# Patient Record
Sex: MALE | Race: WHITE | NOT HISPANIC OR LATINO | Employment: OTHER | URBAN - METROPOLITAN AREA
[De-identification: names, ages, dates, MRNs, and addresses within clinical notes are randomized per-mention and may not be internally consistent; named-entity substitution may affect disease eponyms.]

---

## 2024-04-29 ENCOUNTER — HOSPITAL ENCOUNTER (OUTPATIENT)
Dept: OTHER | Facility: HOSPITAL | Age: 69
Discharge: HOME | End: 2024-04-29
Payer: MEDICARE

## 2024-04-30 ENCOUNTER — OFFICE VISIT (OUTPATIENT)
Dept: NEUROSURGERY | Facility: CLINIC | Age: 69
End: 2024-04-30
Payer: MEDICARE

## 2024-04-30 VITALS
HEART RATE: 72 BPM | HEIGHT: 72 IN | DIASTOLIC BLOOD PRESSURE: 68 MMHG | WEIGHT: 187 LBS | SYSTOLIC BLOOD PRESSURE: 120 MMHG | BODY MASS INDEX: 25.33 KG/M2

## 2024-04-30 DIAGNOSIS — G56.03 BILATERAL CARPAL TUNNEL SYNDROME: ICD-10-CM

## 2024-04-30 DIAGNOSIS — M50.30 DDD (DEGENERATIVE DISC DISEASE), CERVICAL: Primary | ICD-10-CM

## 2024-04-30 DIAGNOSIS — G56.23 ULNAR NEUROPATHY OF BOTH UPPER EXTREMITIES: ICD-10-CM

## 2024-04-30 DIAGNOSIS — R29.898 WEAKNESS OF BOTH ARMS: ICD-10-CM

## 2024-04-30 DIAGNOSIS — M54.2 CERVICALGIA: ICD-10-CM

## 2024-04-30 DIAGNOSIS — M54.12 C6 RADICULOPATHY: ICD-10-CM

## 2024-04-30 DIAGNOSIS — M47.812 FACET ARTHRITIS OF CERVICAL REGION: ICD-10-CM

## 2024-04-30 PROCEDURE — 99204 OFFICE O/P NEW MOD 45 MIN: CPT | Performed by: PHYSICIAN ASSISTANT

## 2024-04-30 RX ORDER — DULOXETIN HYDROCHLORIDE 60 MG/1
60 CAPSULE, DELAYED RELEASE ORAL DAILY
COMMUNITY

## 2024-04-30 RX ORDER — HYDRALAZINE HYDROCHLORIDE 10 MG/1
10 TABLET, FILM COATED ORAL 4 TIMES DAILY
COMMUNITY
Start: 2024-01-29

## 2024-04-30 RX ORDER — ATORVASTATIN CALCIUM 40 MG/1
1 TABLET, FILM COATED ORAL DAILY
COMMUNITY
Start: 2024-03-26 | End: 2024-06-24

## 2024-04-30 RX ORDER — ACETAMINOPHEN 500 MG
500 TABLET ORAL EVERY 6 HOURS PRN
COMMUNITY

## 2024-04-30 RX ORDER — AMLODIPINE BESYLATE 5 MG/1
5 TABLET ORAL
COMMUNITY
Start: 2024-01-29

## 2024-04-30 RX ORDER — PANTOPRAZOLE SODIUM 40 MG/1
1 TABLET, DELAYED RELEASE ORAL DAILY
COMMUNITY
Start: 2024-03-26 | End: 2024-06-24

## 2024-04-30 RX ORDER — DULOXETIN HYDROCHLORIDE 30 MG/1
30 CAPSULE, DELAYED RELEASE ORAL DAILY
COMMUNITY

## 2024-05-13 ENCOUNTER — TELEPHONE (OUTPATIENT)
Dept: NEUROSURGERY | Facility: CLINIC | Age: 69
End: 2024-05-13
Payer: MEDICARE

## 2024-05-13 NOTE — TELEPHONE ENCOUNTER
Caller: KWABENA FINLEY    Relationship to patient: Emergency Contact    Best call back number: [phone]    Patient is needing: PATIENTS WIFE CALLED, STATES PATIENT COMPLETED MRI CERVICAL 05/10/24 @ [facility]. PATIENTS WIFE WOULD LIKE TO SEE IF PATIENT CAN BE SEEN SOONER THAN 05/28/24. PER LAST OV NOTE: F/U IN 4 WEEKS (AROUND 05/28/24). PLEASE REVIEW, PLEASE CALL PATIENT OR PATIENTS WIFE TO ADVISE.    THANK YOU

## 2024-05-14 DIAGNOSIS — M50.30 DDD (DEGENERATIVE DISC DISEASE), CERVICAL: ICD-10-CM

## 2024-05-14 DIAGNOSIS — R29.898 WEAKNESS OF BOTH ARMS: ICD-10-CM

## 2024-05-14 DIAGNOSIS — M47.812 FACET ARTHRITIS OF CERVICAL REGION: ICD-10-CM

## 2024-05-14 DIAGNOSIS — M54.2 CERVICALGIA: ICD-10-CM

## 2024-05-14 DIAGNOSIS — M54.12 C6 RADICULOPATHY: ICD-10-CM

## 2024-05-17 ENCOUNTER — HOSPITAL ENCOUNTER (OUTPATIENT)
Dept: OTHER | Facility: HOSPITAL | Age: 69
Discharge: HOME | End: 2024-05-17

## 2024-05-17 ENCOUNTER — OFFICE VISIT (OUTPATIENT)
Dept: NEUROSURGERY | Facility: CLINIC | Age: 69
End: 2024-05-17
Payer: MEDICARE

## 2024-05-17 VITALS
DIASTOLIC BLOOD PRESSURE: 66 MMHG | BODY MASS INDEX: 25.19 KG/M2 | WEIGHT: 186 LBS | HEART RATE: 91 BPM | SYSTOLIC BLOOD PRESSURE: 121 MMHG | HEIGHT: 72 IN

## 2024-05-17 DIAGNOSIS — M48.02 FORAMINAL STENOSIS OF CERVICAL REGION: ICD-10-CM

## 2024-05-17 DIAGNOSIS — M54.12 C6 RADICULOPATHY: ICD-10-CM

## 2024-05-17 DIAGNOSIS — M54.2 CERVICALGIA: ICD-10-CM

## 2024-05-17 DIAGNOSIS — G56.23 ULNAR NEUROPATHY OF BOTH UPPER EXTREMITIES: ICD-10-CM

## 2024-05-17 DIAGNOSIS — M50.21 HERNIATED NUCLEUS PULPOSUS, C3-4: Primary | ICD-10-CM

## 2024-05-17 DIAGNOSIS — G56.03 BILATERAL CARPAL TUNNEL SYNDROME: ICD-10-CM

## 2024-05-17 DIAGNOSIS — R29.898 WEAKNESS OF BOTH ARMS: ICD-10-CM

## 2024-05-17 DIAGNOSIS — M48.02 SPINAL STENOSIS IN CERVICAL REGION: ICD-10-CM

## 2024-05-17 RX ORDER — MELOXICAM 7.5 MG/1
7.5 TABLET ORAL DAILY
COMMUNITY

## 2024-05-17 NOTE — PROGRESS NOTES
Patient being seen for today for Follow-up  .    Subjective    Warren Baker is a 68 y.o. male that presents with Follow-up  .    HPI  Previously: Last seen on 4/30/2024 for complaints of neck and bilateral arm pain to all the fingers.  He had CT of the cervical spine showing multilevel degenerative change worse at C5-C6, C6-C7 and C7-T1 with likely mild central canal narrowing at C5-C6.  There is NCV/EMG testing from 3/19/2024 showing bilateral median and ulnar neuropathy as well as evidence suggestive of C5-C6 radiculopathy.  There was an order for MRI of the cervical spine without contrast to evaluate the cervical spine pathology further.    Today: He continues with neck and bilateral arm pain to all the fingers, worse in the 1st-3rd digits.    He denies new or changed complaints.     reports that he has been smoking cigarettes. He has never used smokeless tobacco.    Review of Systems   Musculoskeletal:  Positive for neck pain.   Neurological:  Positive for weakness (left arm) and numbness.       Objective   Vitals:    05/17/24 1036   BP: 121/66   Pulse: 91        Physical Exam  Constitutional:       Appearance: Normal appearance.   Pulmonary:      Effort: Pulmonary effort is normal.   Musculoskeletal:         General: Tenderness (right cervical muscles) present.      Comments: Alvarado's and clonus negative bilaterally   Neurological:      General: No focal deficit present.      Mental Status: He is alert and oriented to person, place, and time.      Sensory: No sensory deficit.      Motor: Weakness (left hand and left elbow flexion and extension) present.      Deep Tendon Reflexes: Reflexes normal.   Psychiatric:         Mood and Affect: Mood normal.         Behavior: Behavior normal.          Result Review   I have personally interpreted the MRI of the cervical spine without contrast from 5/10/2024 which shows disc bulging with severe stenosis at C3-C4, there is likely T2 signal change in the left anterior  spinal cord at this level.  Foraminal stenosis is worse on the right at this level.  There is also severe right and moderate to severe left foraminal narrowing at C4-C5, severe bilateral foraminal narrowing at C5-C6 worse on the left.  There is moderate to severe bilateral foraminal narrowing at C6-C7 worse on the left.     Assessment and Plan {CC Problem List  Visit Diagnosis  ROS  Review (Popup)  [facility]  Quality  BestPractice  Medications  SmartSets  SnapShot Encounters  Media :23}   Diagnoses and all orders for this visit:    1. Herniated nucleus pulposus, C3-4 (Primary)    2. Spinal stenosis in cervical region    3. Foraminal stenosis of cervical region    4. Cervicalgia    5. Weakness of both arms    6. C6 radiculopathy    7. Bilateral carpal tunnel syndrome    8. Ulnar neuropathy of both upper extremities    There appears to be T2 signal change in the spinal cord at the C3-C4 level with severe stenosis on the MRI.    There is also multilevel central canal and foraminal stenosis.    He also has median and ulnar neuropathy which may contribute to the arm complaints.    He will follow-up with Dr. Frazier to discuss the MRI and NCV/EMG findings and have further recommendation for possible surgical approach.    Follow Up {Instructions Charge Capture  Follow-up Communications :23}   Return for Next available Thursday to discuss surgery with Dr. Frazier.

## 2024-06-06 ENCOUNTER — OFFICE VISIT (OUTPATIENT)
Dept: NEUROSURGERY | Facility: CLINIC | Age: 69
End: 2024-06-06
Payer: MEDICARE

## 2024-06-06 VITALS
HEIGHT: 72 IN | BODY MASS INDEX: 24.89 KG/M2 | WEIGHT: 183.8 LBS | DIASTOLIC BLOOD PRESSURE: 72 MMHG | SYSTOLIC BLOOD PRESSURE: 110 MMHG

## 2024-06-06 DIAGNOSIS — G95.89 MYELOMALACIA OF CERVICAL CORD: ICD-10-CM

## 2024-06-06 DIAGNOSIS — M48.02 SPINAL STENOSIS IN CERVICAL REGION: Primary | ICD-10-CM

## 2024-06-06 RX ORDER — ASPIRIN 81 MG/1
81 TABLET ORAL DAILY
COMMUNITY

## 2024-06-06 NOTE — PROGRESS NOTES
Warren Baker is a 68 y.o. male that presents with Neck Pain       He has multilevel cervical changes. He has bilateral arm numbness. He noticed worsening symptoms after a fall on New Year's Ara. Nothing makes his symptoms worse or better.     Neck Pain   Associated symptoms include numbness.       Review of Systems   Musculoskeletal:  Positive for gait problem, myalgias and neck pain.   Neurological:  Positive for numbness.        Vitals:    06/06/24 0950   BP: 110/72        Physical Exam  Cardiovascular:      Comments: No notable edema    Pulmonary:      Effort: Pulmonary effort is normal.   Neurological:      Mental Status: He is alert.      Motor: No weakness.      Deep Tendon Reflexes: Reflexes abnormal (+ Hoffmans, right greater than left, 3/4 BUE, no clonus).      Comments: Walks with cane   Psychiatric:         Mood and Affect: Mood normal.             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 Surgery could help reduce the risk of further injury, but not likely fix all his current symptoms.    Follow Up {Instructions Charge Capture  Follow-up Communications :23}   No follow-ups on file.

## 2024-06-17 ENCOUNTER — TELEPHONE (OUTPATIENT)
Dept: NEUROSURGERY | Facility: CLINIC | Age: 69
End: 2024-06-17
Payer: MEDICARE

## 2024-06-18 NOTE — TELEPHONE ENCOUNTER
Looks like patient needs cardiac clearance prior to scheduling. Will contact patient to find out who cardiologist is.

## 2024-06-19 NOTE — TELEPHONE ENCOUNTER
Letter created for Dr. Garcia with  Cardiology. Left voicemail to notify Leda that letter has been sent and once received back, patient will be contacted to schedule. Ok for hub to relay.

## 2024-07-03 PROBLEM — G95.89 MYELOMALACIA OF CERVICAL CORD: Status: ACTIVE | Noted: 2024-06-06

## 2024-07-03 PROBLEM — M48.02 SPINAL STENOSIS IN CERVICAL REGION: Status: ACTIVE | Noted: 2024-06-06

## 2024-07-25 ENCOUNTER — TELEPHONE (OUTPATIENT)
Dept: NEUROSURGERY | Facility: CLINIC | Age: 69
End: 2024-07-25
Payer: MEDICARE

## 2024-07-25 NOTE — TELEPHONE ENCOUNTER
Received a message from financial clearance that patient's surgery scheduled for 8-7-24 will need to be rescheduled due to nonpar insurance. Could someone confirm what insurance he had at last office visit when surgery was scheduled and that it is nonpar with [facility]?

## 2024-07-25 NOTE — TELEPHONE ENCOUNTER
Pt insurance was entered as [employer] non par. I am showing that patient has a human[employer] medicare replacement plan and insurance plan has been changed to reflect that in epic. We are in network with all humana medicare replacement plans.     Please let me know if there is anything else you need me to do.

## 2024-07-25 NOTE — TELEPHONE ENCOUNTER
Sent message back to financial clearance regarding. Will wait to hear back from them before proceeding.

## 2024-07-26 NOTE — TELEPHONE ENCOUNTER
Received message back from financial clearance that patient's insurance is in-network, and they have received an approval.

## 2024-08-02 RX ORDER — PANTOPRAZOLE SODIUM 40 MG/1
40 TABLET, DELAYED RELEASE ORAL DAILY
COMMUNITY

## 2024-08-02 RX ORDER — ATORVASTATIN CALCIUM 40 MG/1
40 TABLET, FILM COATED ORAL DAILY
COMMUNITY

## 2024-08-02 NOTE — PRE-PROCEDURE INSTRUCTIONS
PATIENT INSTRUCTED TO BE:    - NOTHING TO EAT AFTER MIDNIGHT OR CHEW, EXCEPT CAN HAVE CLEAR LIQUIDS 2 HOURS PRIOR TO SURGERY ARRIVAL TIME , NO MORE THAN 8 OZ. (NOTHING RED)     - TO HOLD ALL VITAMINS, SUPPLEMENTS, NSAIDS FOR ONE WEEK PRIOR TO THEIR SURGICAL PROCEDURE ( STOP MELOXICAM)    - DO NOT TAKE __N/A____________________ 7 DAYS PRIOR TO PROCEDURE PER ANESTHESIA RECOMMENDATIONS/INSTRUCTIONS     - INSTRUCTED PT TO USE SURGICAL SOAP 1 TIME THE NIGHT PRIOR TO SURGERY __1-7-50_________ OR THE AM OF SURGERY ___1-4-40__________   USE THE SOAP FROM NECK TO TOES, AVOID THEIR FACE, HAIR, AND PRIVATE PARTS. IF USE THE SOAP THE NIGHT PRIOR TO SURGERY, CHANGE BED LINENS AND NO PETS IN THE BED.     INSTRUCTED NO LOTIONS, JEWELRY, PIERCINGS,  NAIL POLISH, OR DEODORANT DAY OF SURGERY    - IF DIABETIC, CHECK BLOOD GLUCOSE IF LESS THAN 70 OR HAVING SYMPTOMS CALL THE PREOP AREA FOR INSTRUCTIONS ON AM OF SURGERY ([phone] )    -INSTRUCTED TO TAKE THE FOLLOWING MEDICATIONS THE DAY OF SURGERY WITH SIPS OF WATER:        TYLENOL IF NEEDED, NORVASC, LIPITOR, CYMBALTA, APRESOLINE, PROTONIX        - DO NOT BRING ANY MEDICATIONS WITH YOU TO THE HOSPITAL THE DAY OF SURGERY, EXCEPT IF USE INHALERS. BRING INHALERS DAY OF SURGERY       - BRING CPAP OR BIPAP TO THE HOSPITAL ONLY IF YOU ARE SPENDING THE NIGHT    - DO NOT SMOKE OR VAPE 24 HOURS PRIOR TO PROCEDURE PER ANESTHESIA REQUEST     -MAKE SURE YOU HAVE A RIDE HOME OR SOMEONE TO STAY WITH YOU THE DAY OF THE PROCEDURE AFTER YOU GO HOME     - FOLLOW ANY OTHER INSTRUCTIONS GIVEN TO YOU BY YOUR SURGEON'S OFFICE.     - DAY OF SURGERY _8-7-24_, COME TO ELEVATOR A, THIRD FLOOR, CHECK IN AT THE DESK FOR REGISTRATION/SURGERY     - YOU WILL RECEIVE A PHONE CALL THE DAY PRIOR TO SURGERY BETWEEN 1PM AND 4 PM WITH ARRIVAL TIME, IF YOUR SURGERY IS ON A MONDAY YOU WILL RECEIVE A CALL THE FRIDAY PRIOR TO SURGERY DATE    - BRING CASH OR CREDIT CARD FOR COPAYMENT OF MEDICATIONS AFTER SURGERY IF YOU USE  THE HOSPITAL PHARMACY (MEDS TO BED)    - PREADMISSION TESTING NURSE FABIÁN BUCK -[phone] IF HAVE ANY QUESTIONS     -PATIENT PROVIDED THE NUMBER FOR PREOP SURGICAL DEPT IF HAD QUESTIONS AFTER HOURS PRIOR TO SURGERY ([phone] ).  INFORMED PT IF NO ANSWER, LEAVE A MESSAGE AND SOMEONE WILL RETURN THEIR CALL       PATIENT VERBALIZED UNDERSTANDING       Clear Liquid Diet        Find out when you need to start a clear liquid diet.   Think of “clear liquids” as anything you could read a newspaper through. This includes things like water, broth, sports drinks, or tea WITHOUT any kind of milk or cream.           Once you are told to start a clear liquid diet, only drink these things until 2 hours before arrival to the hospital or when the hospital says to stop. Total volume limitation: 8 oz.       Clear liquids you CAN drink:   Water   Clear broth: beef, chicken, vegetable, or bone broth with nothing in it   Gatorade   Lemonade or Sanket-aid   Soda   Tea, coffee (NO cream or honey)   Jell-O (without fruit)   Popsicles (without fruit or cream)   Italian ices   Juice without pulp: apple, white, grape   You may use salt, pepper, and sugar  NO RED  NO NOODLES    Do NOT drink:   Milk or cream   Soy milk, almond milk, coconut milk, or other non-dairy drinks and   creamers   Milkshakes or smoothies   Tomato juice   Orange juice   Grapefruit juice   Cream soups or any other than broth         Clear Liquid Diet:  Do NOT eat any solid food.  Do NOT eat or suck on mints or candy.  Do NOT chew gum.  Do NOT drink thick liquids like milk or juice with pulp in it.  Do NOT add milk, cream, or anything like soy milk or almond milk to coffee or tea.

## 2024-08-05 ENCOUNTER — ANESTHESIA EVENT (OUTPATIENT)
Dept: PERIOP | Facility: HOSPITAL | Age: 69
End: 2024-08-05
Payer: MEDICARE

## 2024-08-07 ENCOUNTER — ANESTHESIA (OUTPATIENT)
Dept: PERIOP | Facility: HOSPITAL | Age: 69
End: 2024-08-07
Payer: MEDICARE

## 2024-08-07 ENCOUNTER — APPOINTMENT (OUTPATIENT)
Dept: GENERAL RADIOLOGY | Facility: HOSPITAL | Age: 69
End: 2024-08-07
Payer: MEDICARE

## 2024-08-07 ENCOUNTER — HOSPITAL ENCOUNTER (OUTPATIENT)
Facility: HOSPITAL | Age: 69
Discharge: HOME | End: 2024-08-08
Attending: NEUROLOGICAL SURGERY | Admitting: NEUROLOGICAL SURGERY
Payer: MEDICARE

## 2024-08-07 DIAGNOSIS — M48.02 SPINAL STENOSIS IN CERVICAL REGION: ICD-10-CM

## 2024-08-07 DIAGNOSIS — G95.89 MYELOMALACIA OF CERVICAL CORD: ICD-10-CM

## 2024-08-07 DIAGNOSIS — Z98.1 STATUS POST CERVICAL SPINAL FUSION: Primary | ICD-10-CM

## 2024-08-07 LAB
QT INTERVAL: 382 MS
QTC INTERVAL: 455 MS

## 2024-08-07 PROCEDURE — C1713 ANCHOR/SCREW BN/BN,TIS/BN: HCPCS | Performed by: NEUROLOGICAL SURGERY

## 2024-08-07 PROCEDURE — 22551 ARTHRD ANT NTRBDY CERVICAL: CPT | Performed by: NEUROLOGICAL SURGERY

## 2024-08-07 PROCEDURE — S0260 H&P FOR SURGERY: HCPCS | Performed by: NEUROLOGICAL SURGERY

## 2024-08-07 PROCEDURE — 25010000002 DEXAMETHASONE PER 1 MG

## 2024-08-07 PROCEDURE — 25010000002 CEFAZOLIN PER 500 MG: Performed by: NEUROLOGICAL SURGERY

## 2024-08-07 PROCEDURE — 20931 SP BONE ALGRFT STRUCT ADD-ON: CPT | Performed by: NEUROLOGICAL SURGERY

## 2024-08-07 PROCEDURE — 76000 FLUOROSCOPY <1 HR PHYS/QHP: CPT

## 2024-08-07 PROCEDURE — 25810000003 LACTATED RINGERS PER 1000 ML: Performed by: ANESTHESIOLOGY

## 2024-08-07 PROCEDURE — 22551 ARTHRD ANT NTRBDY CERVICAL: CPT | Performed by: SPECIALIST/TECHNOLOGIST, OTHER

## 2024-08-07 PROCEDURE — 93005 ELECTROCARDIOGRAM TRACING: CPT | Performed by: ANESTHESIOLOGY

## 2024-08-07 PROCEDURE — 22845 INSERT SPINE FIXATION DEVICE: CPT | Performed by: NEUROLOGICAL SURGERY

## 2024-08-07 PROCEDURE — 25010000002 MIDAZOLAM PER 1MG: Performed by: ANESTHESIOLOGY

## 2024-08-07 PROCEDURE — 25010000002 PROPOFOL 200 MG/20ML EMULSION

## 2024-08-07 PROCEDURE — 94799 UNLISTED PULMONARY SVC/PX: CPT

## 2024-08-07 PROCEDURE — C1894 INTRO/SHEATH, NON-LASER: HCPCS | Performed by: NEUROLOGICAL SURGERY

## 2024-08-07 PROCEDURE — 25810000003 SODIUM CHLORIDE 0.9 % SOLUTION: Performed by: NEUROLOGICAL SURGERY

## 2024-08-07 PROCEDURE — 25010000002 SUGAMMADEX 200 MG/2ML SOLUTION

## 2024-08-07 PROCEDURE — 25010000002 ONDANSETRON PER 1 MG

## 2024-08-07 PROCEDURE — 25010000002 HYDROMORPHONE 1 MG/ML SOLUTION

## 2024-08-07 PROCEDURE — 22845 INSERT SPINE FIXATION DEVICE: CPT | Performed by: SPECIALIST/TECHNOLOGIST, OTHER

## 2024-08-07 PROCEDURE — 25010000002 FENTANYL CITRATE (PF) 50 MCG/ML SOLUTION

## 2024-08-07 DEVICE — ALLOGRFT SPINE CERV VERTIGRAFT WEDGE FZ 7DEG PRESERV 5.75MM: Type: IMPLANTABLE DEVICE | Site: SPINE CERVICAL | Status: FUNCTIONAL

## 2024-08-07 DEVICE — PLT SKYLINE 1LEVEL 14MM: Type: IMPLANTABLE DEVICE | Site: SPINE CERVICAL | Status: FUNCTIONAL

## 2024-08-07 DEVICE — SCRW SKYLINE VAR SD 14MM: Type: IMPLANTABLE DEVICE | Site: SPINE CERVICAL | Status: FUNCTIONAL

## 2024-08-07 RX ORDER — LIDOCAINE HYDROCHLORIDE AND EPINEPHRINE 10; 10 MG/ML; UG/ML
INJECTION, SOLUTION INFILTRATION; PERINEURAL AS NEEDED
Status: DISCONTINUED | OUTPATIENT
Start: 2024-08-07 | End: 2024-08-07 | Stop reason: HOSPADM

## 2024-08-07 RX ORDER — POLYETHYLENE GLYCOL 3350 17 G/17G
17 POWDER, FOR SOLUTION ORAL DAILY PRN
Status: DISCONTINUED | OUTPATIENT
Start: 2024-08-07 | End: 2024-08-08 | Stop reason: HOSPADM

## 2024-08-07 RX ORDER — ONDANSETRON 2 MG/ML
4 INJECTION INTRAMUSCULAR; INTRAVENOUS EVERY 6 HOURS PRN
Status: DISCONTINUED | OUTPATIENT
Start: 2024-08-07 | End: 2024-08-08 | Stop reason: HOSPADM

## 2024-08-07 RX ORDER — HYDRALAZINE HYDROCHLORIDE 10 MG/1
10 TABLET, FILM COATED ORAL 4 TIMES DAILY
Status: DISCONTINUED | OUTPATIENT
Start: 2024-08-07 | End: 2024-08-08 | Stop reason: HOSPADM

## 2024-08-07 RX ORDER — SODIUM CHLORIDE 9 MG/ML
40 INJECTION, SOLUTION INTRAVENOUS AS NEEDED
Status: DISCONTINUED | OUTPATIENT
Start: 2024-08-07 | End: 2024-08-07 | Stop reason: HOSPADM

## 2024-08-07 RX ORDER — SODIUM CHLORIDE 9 MG/ML
50 INJECTION, SOLUTION INTRAVENOUS CONTINUOUS
Status: DISCONTINUED | OUTPATIENT
Start: 2024-08-07 | End: 2024-08-08 | Stop reason: HOSPADM

## 2024-08-07 RX ORDER — ONDANSETRON 2 MG/ML
4 INJECTION INTRAMUSCULAR; INTRAVENOUS ONCE AS NEEDED
Status: DISCONTINUED | OUTPATIENT
Start: 2024-08-07 | End: 2024-08-07 | Stop reason: HOSPADM

## 2024-08-07 RX ORDER — ATORVASTATIN CALCIUM 40 MG/1
40 TABLET, FILM COATED ORAL DAILY
Status: DISCONTINUED | OUTPATIENT
Start: 2024-08-07 | End: 2024-08-08 | Stop reason: HOSPADM

## 2024-08-07 RX ORDER — BISACODYL 10 MG
10 SUPPOSITORY, RECTAL RECTAL DAILY PRN
Status: DISCONTINUED | OUTPATIENT
Start: 2024-08-07 | End: 2024-08-08 | Stop reason: HOSPADM

## 2024-08-07 RX ORDER — SODIUM CHLORIDE 9 MG/ML
40 INJECTION, SOLUTION INTRAVENOUS AS NEEDED
Status: DISCONTINUED | OUTPATIENT
Start: 2024-08-07 | End: 2024-08-08 | Stop reason: HOSPADM

## 2024-08-07 RX ORDER — MAGNESIUM HYDROXIDE 1200 MG/15ML
LIQUID ORAL AS NEEDED
Status: DISCONTINUED | OUTPATIENT
Start: 2024-08-07 | End: 2024-08-07 | Stop reason: HOSPADM

## 2024-08-07 RX ORDER — DEXAMETHASONE SODIUM PHOSPHATE 4 MG/ML
INJECTION, SOLUTION INTRA-ARTICULAR; INTRALESIONAL; INTRAMUSCULAR; INTRAVENOUS; SOFT TISSUE AS NEEDED
Status: DISCONTINUED | OUTPATIENT
Start: 2024-08-07 | End: 2024-08-07 | Stop reason: SURG

## 2024-08-07 RX ORDER — PROPOFOL 10 MG/ML
INJECTION, EMULSION INTRAVENOUS AS NEEDED
Status: DISCONTINUED | OUTPATIENT
Start: 2024-08-07 | End: 2024-08-07 | Stop reason: SURG

## 2024-08-07 RX ORDER — MIDAZOLAM HYDROCHLORIDE 2 MG/2ML
2 INJECTION, SOLUTION INTRAMUSCULAR; INTRAVENOUS ONCE
Status: COMPLETED | OUTPATIENT
Start: 2024-08-07 | End: 2024-08-07

## 2024-08-07 RX ORDER — AMOXICILLIN 250 MG
2 CAPSULE ORAL 2 TIMES DAILY PRN
Status: DISCONTINUED | OUTPATIENT
Start: 2024-08-07 | End: 2024-08-08 | Stop reason: HOSPADM

## 2024-08-07 RX ORDER — PROMETHAZINE HYDROCHLORIDE 12.5 MG/1
25 TABLET ORAL ONCE AS NEEDED
Status: DISCONTINUED | OUTPATIENT
Start: 2024-08-07 | End: 2024-08-07 | Stop reason: HOSPADM

## 2024-08-07 RX ORDER — ROCURONIUM BROMIDE 10 MG/ML
INJECTION, SOLUTION INTRAVENOUS AS NEEDED
Status: DISCONTINUED | OUTPATIENT
Start: 2024-08-07 | End: 2024-08-07 | Stop reason: SURG

## 2024-08-07 RX ORDER — ACETAMINOPHEN 160 MG/5ML
650 SOLUTION ORAL EVERY 8 HOURS
Status: DISCONTINUED | OUTPATIENT
Start: 2024-08-07 | End: 2024-08-08 | Stop reason: HOSPADM

## 2024-08-07 RX ORDER — PHENYLEPHRINE HCL IN 0.9% NACL 1 MG/10 ML
SYRINGE (ML) INTRAVENOUS AS NEEDED
Status: DISCONTINUED | OUTPATIENT
Start: 2024-08-07 | End: 2024-08-07 | Stop reason: SURG

## 2024-08-07 RX ORDER — HYDROCODONE BITARTRATE AND ACETAMINOPHEN 7.5; 325 MG/1; MG/1
1 TABLET ORAL EVERY 4 HOURS PRN
Status: DISCONTINUED | OUTPATIENT
Start: 2024-08-07 | End: 2024-08-08 | Stop reason: HOSPADM

## 2024-08-07 RX ORDER — SODIUM CHLORIDE, SODIUM LACTATE, POTASSIUM CHLORIDE, CALCIUM CHLORIDE 600; 310; 30; 20 MG/100ML; MG/100ML; MG/100ML; MG/100ML
9 INJECTION, SOLUTION INTRAVENOUS CONTINUOUS PRN
Status: DISCONTINUED | OUTPATIENT
Start: 2024-08-07 | End: 2024-08-07 | Stop reason: HOSPADM

## 2024-08-07 RX ORDER — FENTANYL CITRATE 50 UG/ML
INJECTION, SOLUTION INTRAMUSCULAR; INTRAVENOUS AS NEEDED
Status: DISCONTINUED | OUTPATIENT
Start: 2024-08-07 | End: 2024-08-07 | Stop reason: SURG

## 2024-08-07 RX ORDER — ACETAMINOPHEN 500 MG
1000 TABLET ORAL ONCE
Status: COMPLETED | OUTPATIENT
Start: 2024-08-07 | End: 2024-08-07

## 2024-08-07 RX ORDER — BISACODYL 5 MG/1
5 TABLET, DELAYED RELEASE ORAL DAILY PRN
Status: DISCONTINUED | OUTPATIENT
Start: 2024-08-07 | End: 2024-08-08 | Stop reason: HOSPADM

## 2024-08-07 RX ORDER — DULOXETIN HYDROCHLORIDE 30 MG/1
60 CAPSULE, DELAYED RELEASE ORAL DAILY
Status: DISCONTINUED | OUTPATIENT
Start: 2024-08-07 | End: 2024-08-08 | Stop reason: HOSPADM

## 2024-08-07 RX ORDER — PROMETHAZINE HYDROCHLORIDE 25 MG/1
25 SUPPOSITORY RECTAL ONCE AS NEEDED
Status: DISCONTINUED | OUTPATIENT
Start: 2024-08-07 | End: 2024-08-07 | Stop reason: HOSPADM

## 2024-08-07 RX ORDER — LIDOCAINE HYDROCHLORIDE 20 MG/ML
INJECTION, SOLUTION EPIDURAL; INFILTRATION; INTRACAUDAL; PERINEURAL AS NEEDED
Status: DISCONTINUED | OUTPATIENT
Start: 2024-08-07 | End: 2024-08-07 | Stop reason: SURG

## 2024-08-07 RX ORDER — ACETAMINOPHEN 325 MG/1
650 TABLET ORAL EVERY 8 HOURS
Status: DISCONTINUED | OUTPATIENT
Start: 2024-08-07 | End: 2024-08-08 | Stop reason: HOSPADM

## 2024-08-07 RX ORDER — ONDANSETRON 2 MG/ML
INJECTION INTRAMUSCULAR; INTRAVENOUS AS NEEDED
Status: DISCONTINUED | OUTPATIENT
Start: 2024-08-07 | End: 2024-08-07 | Stop reason: SURG

## 2024-08-07 RX ORDER — PANTOPRAZOLE SODIUM 40 MG/1
40 TABLET, DELAYED RELEASE ORAL DAILY
Status: DISCONTINUED | OUTPATIENT
Start: 2024-08-07 | End: 2024-08-08 | Stop reason: HOSPADM

## 2024-08-07 RX ORDER — DULOXETIN HYDROCHLORIDE 30 MG/1
30 CAPSULE, DELAYED RELEASE ORAL DAILY
Status: DISCONTINUED | OUTPATIENT
Start: 2024-08-07 | End: 2024-08-08 | Stop reason: HOSPADM

## 2024-08-07 RX ORDER — ACETAMINOPHEN 650 MG/1
650 SUPPOSITORY RECTAL EVERY 8 HOURS
Status: DISCONTINUED | OUTPATIENT
Start: 2024-08-07 | End: 2024-08-08 | Stop reason: HOSPADM

## 2024-08-07 RX ORDER — AMLODIPINE BESYLATE 5 MG/1
5 TABLET ORAL DAILY
Status: DISCONTINUED | OUTPATIENT
Start: 2024-08-07 | End: 2024-08-08 | Stop reason: HOSPADM

## 2024-08-07 RX ORDER — NALOXONE HCL 0.4 MG/ML
0.4 VIAL (ML) INJECTION
Status: DISCONTINUED | OUTPATIENT
Start: 2024-08-07 | End: 2024-08-08 | Stop reason: HOSPADM

## 2024-08-07 RX ORDER — OXYCODONE HYDROCHLORIDE 5 MG/1
5 TABLET ORAL
Status: DISCONTINUED | OUTPATIENT
Start: 2024-08-07 | End: 2024-08-07 | Stop reason: HOSPADM

## 2024-08-07 RX ORDER — HYDROCODONE BITARTRATE AND ACETAMINOPHEN 5; 325 MG/1; MG/1
1 TABLET ORAL EVERY 4 HOURS PRN
Status: DISCONTINUED | OUTPATIENT
Start: 2024-08-07 | End: 2024-08-08 | Stop reason: HOSPADM

## 2024-08-07 RX ADMIN — HYDROMORPHONE HYDROCHLORIDE 0.5 MG: 1 INJECTION, SOLUTION INTRAMUSCULAR; INTRAVENOUS; SUBCUTANEOUS at 09:31

## 2024-08-07 RX ADMIN — ACETAMINOPHEN 650 MG: 325 TABLET ORAL at 22:30

## 2024-08-07 RX ADMIN — Medication 100 MCG: at 07:27

## 2024-08-07 RX ADMIN — ONDANSETRON 4 MG: 2 INJECTION INTRAMUSCULAR; INTRAVENOUS at 08:48

## 2024-08-07 RX ADMIN — DEXAMETHASONE SODIUM PHOSPHATE 4 MG: 4 INJECTION, SOLUTION INTRAMUSCULAR; INTRAVENOUS at 07:39

## 2024-08-07 RX ADMIN — MIDAZOLAM HYDROCHLORIDE 2 MG: 1 INJECTION, SOLUTION INTRAMUSCULAR; INTRAVENOUS at 07:11

## 2024-08-07 RX ADMIN — ROCURONIUM BROMIDE 50 MG: 10 INJECTION, SOLUTION INTRAVENOUS at 07:26

## 2024-08-07 RX ADMIN — HYDROMORPHONE HYDROCHLORIDE 0.5 MG: 1 INJECTION, SOLUTION INTRAMUSCULAR; INTRAVENOUS; SUBCUTANEOUS at 09:24

## 2024-08-07 RX ADMIN — AMLODIPINE BESYLATE 5 MG: 5 TABLET ORAL at 11:04

## 2024-08-07 RX ADMIN — ACETAMINOPHEN 650 MG: 325 TABLET ORAL at 14:51

## 2024-08-07 RX ADMIN — LIDOCAINE HYDROCHLORIDE 80 MG: 20 INJECTION, SOLUTION EPIDURAL; INFILTRATION; INTRACAUDAL; PERINEURAL at 07:26

## 2024-08-07 RX ADMIN — SODIUM CHLORIDE 2000 MG: 9 INJECTION, SOLUTION INTRAVENOUS at 14:51

## 2024-08-07 RX ADMIN — HYDRALAZINE HYDROCHLORIDE 10 MG: 10 TABLET ORAL at 20:25

## 2024-08-07 RX ADMIN — PROPOFOL 120 MG: 10 INJECTION, EMULSION INTRAVENOUS at 07:26

## 2024-08-07 RX ADMIN — ACETAMINOPHEN 1000 MG: 500 TABLET ORAL at 07:10

## 2024-08-07 RX ADMIN — SUGAMMADEX 200 MG: 100 INJECTION, SOLUTION INTRAVENOUS at 08:54

## 2024-08-07 RX ADMIN — SODIUM CHLORIDE, POTASSIUM CHLORIDE, SODIUM LACTATE AND CALCIUM CHLORIDE 9 ML/HR: 600; 310; 30; 20 INJECTION, SOLUTION INTRAVENOUS at 06:48

## 2024-08-07 RX ADMIN — FENTANYL CITRATE 100 MCG: 50 INJECTION, SOLUTION INTRAMUSCULAR; INTRAVENOUS at 07:26

## 2024-08-07 RX ADMIN — HYDRALAZINE HYDROCHLORIDE 10 MG: 10 TABLET ORAL at 11:04

## 2024-08-07 RX ADMIN — SODIUM CHLORIDE 2 G: 9 INJECTION, SOLUTION INTRAVENOUS at 07:39

## 2024-08-07 RX ADMIN — ROCURONIUM BROMIDE 10 MG: 10 INJECTION, SOLUTION INTRAVENOUS at 08:22

## 2024-08-07 RX ADMIN — SODIUM CHLORIDE 2000 MG: 9 INJECTION, SOLUTION INTRAVENOUS at 22:32

## 2024-08-07 RX ADMIN — OXYCODONE HYDROCHLORIDE 5 MG: 5 TABLET ORAL at 09:32

## 2024-08-07 RX ADMIN — FENTANYL CITRATE 100 MCG: 50 INJECTION, SOLUTION INTRAMUSCULAR; INTRAVENOUS at 08:05

## 2024-08-07 RX ADMIN — SODIUM CHLORIDE 50 ML/HR: 9 INJECTION, SOLUTION INTRAVENOUS at 10:21

## 2024-08-07 NOTE — ANESTHESIA PREPROCEDURE EVALUATION
Anesthesia Evaluation     Patient summary reviewed and Nursing notes reviewed                Airway   Mallampati: I  TM distance: >3 FB  Neck ROM: full  No difficulty expected  Dental    (+) lower dentures and partials    Pulmonary - negative pulmonary ROS and normal exam    breath sounds clear to auscultation  Cardiovascular - negative cardio ROS    Rhythm: regular  Rate: normal    (+) hypertension, valvular problems/murmurs AS, AI and MR, past MI , murmur, hyperlipidemia      Neuro/Psych- negative ROS  GI/Hepatic/Renal/Endo - negative ROS   (+) GERD    Musculoskeletal (-) negative ROS    (+) neck pain  Abdominal    Substance History - negative use     OB/GYN negative ob/gyn ROS         Other        ROS/Med Hx Other: ECHO 3/30/23  ·  Left Ventricle: The left ventricular systolic function is normal.  The   LVEF as measured by Heart Model 3D volume is 63%. The diastolic function   is normal.   ·  Right Ventricle: The right ventricle is normal in size. The right   ventricular systolic function is grossly normal.   ·  Aortic Valve: There is moderate aortic valve regurgitation with an   eccentric jet directed against the anterior mitral leaflet. There is mild   to moderate aortic stenosis. VTI index: 0.4   ·  Mitral Valve: The leaflets appear thickened. There is diastolic   fluttering of the mitral valve leaflet from aortic regurgitation jet.   There is moderate mitral regurgitation. The width of the vena contracta   suggests moderate (0.3 - 0.6 cm) mitral regurgitation.  Regurgitant volume   using RVOT dimension (24mm) and RVOT VTI (17cm): 32 cc, Regurgitant   fraction 29%.  SV based on RVOT [id number]; SVI = 38cc/m2. There is no   mitral stenosis.                 Anesthesia Plan    ASA 3     general     intravenous induction     Anesthetic plan, risks, benefits, and alternatives have been provided, discussed and informed consent has been obtained with: patient.    CODE STATUS:

## 2024-08-07 NOTE — PLAN OF CARE
Goal Outcome Evaluation:  Plan of Care Reviewed With: patient, spouse        Progress: no change  Outcome Evaluation: Complaints of minimal pain, scheduled tylenol effective, up to bathroom, advanced to regular diet (mechanical soft), began to choke, complaints of increased difficulty swallowing, MD aware, diet changed to full liquids, advance slowly.

## 2024-08-07 NOTE — H&P
[facility]   HISTORY AND PHYSICAL    Patient Name: Warren Baker  : 1955  MRN: [id number]  Primary Care Physician:  Rip Watson DO  Date of admission: 2024    Subjective   Subjective     Chief Complaint: Bilateral arm numbness    History of Present Illness  67 yo male with C3-4 stenosis, bilateral arm numbness with C3-4 stenosis and myelomalacia.  Secondary to the myelomalacia it was recommended he undergo ACDF to decompress the cord and reduce the risk of further injury.      Review of Systems   Musculoskeletal:  Positive for neck pain.   Neurological:  Positive for numbness.        Personal History     Past Medical History:   Diagnosis Date    GERD (gastroesophageal reflux disease)     Heart attack 2014    1 STENT- SEE'S DR. CUI AT     Hyperlipidemia     Hypertension     Neck pain     C3-C4       Past Surgical History:   Procedure Laterality Date    AORTA SURGERY      FEM= FEM    CORONARY ANGIOPLASTY WITH STENT PLACEMENT      1 STENT        Family History: Family history is unknown by patient. Otherwise pertinent FHx was reviewed and not pertinent to current issue.    Social History:  reports that he has been smoking cigarettes. He has never used smokeless tobacco. Alcohol use questions deferred to the physician. Drug use questions deferred to the physician.    Home Medications:  DULoxetine, acetaminophen, amLODIPine, aspirin, atorvastatin, hydrALAZINE, meloxicam, and pantoprazole    Allergies:  Allergies   Allergen Reactions    Penicillins Anaphylaxis, Unknown - High Severity and Unknown (See Comments)       Objective    Objective     Vitals:   Temp:  [96.7 °F (35.9 °C)] 96.7 °F (35.9 °C)  Heart Rate:  [86] 86  Resp:  [20] 20  BP: (134)/(64) 134/64    Physical Exam  Constitutional:       Appearance: He is normal weight.   Cardiovascular:      Comments: No notable edema  Pulmonary:      Effort: Pulmonary effort is normal.   Skin:     General: Skin is warm and dry.    Neurological:      Mental Status: He is alert.   Psychiatric:         Mood and Affect: Mood normal.         Assessment & Plan   Assessment / Plan     Brief Patient Summary:  Warren Baker is a 68 y.o. male who has C3-4 stenosis with myelomalacia.    Active Hospital Problems:  Active Hospital Problems    Diagnosis     Spinal stenosis in cervical region     Myelomalacia of cervical cord      Plan:   OR today for anterior cervical discectomy and fusion using allograft bone and instrumentation, right approach, cervical 3-cervical 4.  Risk and benefits discussed with patient.  Desires to proceed.    VTE Prophylaxis:  Mechanical VTE prophylaxis orders are present.        CODE STATUS:       Admission Status:  I believe this patient meets outpatient in a bed status.    Patrice Frazier MD

## 2024-08-07 NOTE — OP NOTE
CERVICAL DISCECTOMY ANTERIOR WITH FUSION  Procedure Report    Patient Name:  Warren Baker  YOB: 1955    Date of Surgery:  8/7/2024     Indications: Cervical 3 to cervical 4 stenosis with myelomalacia    Pre-op Diagnosis:   Spinal stenosis in cervical region [M48.02]  Myelomalacia of cervical cord [G95.89]       Post-Op Diagnosis Codes:     * Spinal stenosis in cervical region [M48.02]     * Myelomalacia of cervical cord [G95.89]    Procedure/CPT® Codes:      Procedure(s):  ANTERIOR CERVICAL DISCECTOMY AND FUSION USING ALLOGRAFT BONE AND INSTRUMENTATION, right approach, cervical 3-cervical 4    Staff:  Surgeon(s):  Patrice Frazier MD    Assistant: Ayah King RN CSA    Anesthesia: General    Estimated Blood Loss: 10 mL      Specimen:          None        Findings: Disc osteophyte with spinal cord compression    Complications: No apparent intraoperative complications.     Description of Procedure:   After informed consent was obtained, the patient was brought to the operating room.  After the induction of adequate general endotracheal anesthesia, the patient was placed in the supine position.  A small bump was placed under the neck and the head was placed on a donut head tracy.  The neck was prepped and draped in typical fashion.  A timeout was performed.  The surgical level was localized using the C arm.  A transverse skin crease was divided and taken down through the platysma.  The platysma was undermined superiorly and inferiorly.  An avascular plane was then created medial to the carotid artery.  Dissection was continued down to the anterior cervical spine.  The anterior cervical spine was cleared of soft tissue using a Kittner sponge.    The C3-4 disc base was localized using a spinal needle and the C arm.  The longus coli was then dissected from C3 and C4.  The shadow line retractor was placed.  A distraction pin was placed at C3 and the level confirmed with fluoroscopy.  A  distraction pin was then placed at C4 in the disc base distracted across.  The microscope was brought in and used for the remainder of the procedure for improved magnification and illumination.  The disc base was incised.  Disc material was removed using a combination of the curette and the wings pituitary.  The disc was removed to the posterior osteophyte which was undercut using a 1 mm Kerrison punch.  After undercutting the posterior osteophyte, the posterior longitudinal ligament was elevated using a micro nerve hook and then undercut.  There was some calcification of the posterior longitudinal ligament.  After undercutting from uncovertebral to uncovertebral joint, hemostasis was obtained using a bipolar electrocautery.  The VG-2 sizers were used to size the disc space defect.  A 5 x 7 graft was seen to be the appropriate fit.  This was obtained, rinsed and reconstituted.  The bone graft was then tamped into the disc space defect slightly countersinking.  The distraction pins at C3 and C4 were removed and the holes left by the distraction pins filled with bone wax.  iPling from [employer] was used for the instrumentation.  A size 14 plate was affixed to C3 and C4 using 14 mm bone screws.  The cam screws were then tightened.    The Shadow-line retractor was removed and hemostasis was assured in the soft tissue.  The wound was irrigated with normal saline.  The platysma was reapproximated using a running 2-0 Vicryl followed by a subcutaneous 4-0 Monocryl to reapproximate the skin edges.  The wound was dressed with Mastisol, Steri-Strips, Telfa and Tegaderm.  There were no apparent intraoperative complications.  All sponge and needle counts were correct.  The patient received a dose of preoperative antibiotics.    Spinal Surgery Levels Completed:1 Level      Assistant: Ayah Knig RN CSA  was responsible for performing the following activities: Retraction, Suction, Irrigation, and Placing Dressing and their  skilled assistance was necessary for the success of this case.    Patrice Frazier MD     Date: 8/7/2024  Time: 09:10 EDT

## 2024-08-07 NOTE — ANESTHESIA POSTPROCEDURE EVALUATION
Patient: Warren Baker    Procedure Summary       Date: 08/07/24 Room / Location: [facility] OR  / [facility] MAIN OR    Anesthesia Start: 0720 Anesthesia Stop: 0910    Procedure: ANTERIOR CERVICAL DISCECTOMY AND FUSION USING ALLOGRAFT BONE AND INSTRUMENTATION, right approach, cervical 3-cervical 4 (Right: Spine Cervical) Diagnosis:       Spinal stenosis in cervical region      Myelomalacia of cervical cord      (Spinal stenosis in cervical region [M48.02])      (Myelomalacia of cervical cord [G95.89])    Surgeons: Patrice Frazier MD Provider: Theodore Albarado MD    Anesthesia Type: general ASA Status: 3            Anesthesia Type: general    Vitals  Vitals Value Taken Time   /43 08/07/24 0927   Temp 36 °C (96.8 °F) 08/07/24 0905   Pulse 88 08/07/24 0929   Resp 22 08/07/24 0910   SpO2 95 % 08/07/24 0929   Vitals shown include unfiled device data.      Post Anesthesia Care and Evaluation    Patient location during evaluation: bedside  Patient participation: complete - patient participated  Level of consciousness: awake    Airway patency: patent  PONV Status: none  Cardiovascular status: acceptable  Respiratory status: acceptable  Hydration status: acceptable

## 2024-08-08 VITALS
WEIGHT: 177.47 LBS | OXYGEN SATURATION: 96 % | TEMPERATURE: 97.9 F | BODY MASS INDEX: 24.04 KG/M2 | RESPIRATION RATE: 16 BRPM | HEART RATE: 88 BPM | SYSTOLIC BLOOD PRESSURE: 115 MMHG | HEIGHT: 72 IN | DIASTOLIC BLOOD PRESSURE: 75 MMHG

## 2024-08-08 PROCEDURE — 94799 UNLISTED PULMONARY SVC/PX: CPT

## 2024-08-08 PROCEDURE — 99024 POSTOP FOLLOW-UP VISIT: CPT | Performed by: NEUROLOGICAL SURGERY

## 2024-08-08 RX ORDER — HYDROCODONE BITARTRATE AND ACETAMINOPHEN 5; 325 MG/1; MG/1
1 TABLET ORAL EVERY 6 HOURS PRN
Qty: 15 TABLET | Refills: 0 | Status: SHIPPED | OUTPATIENT
Start: 2024-08-08

## 2024-08-08 RX ADMIN — DULOXETINE HYDROCHLORIDE 60 MG: 30 CAPSULE, DELAYED RELEASE ORAL at 08:32

## 2024-08-08 RX ADMIN — ACETAMINOPHEN 650 MG: 325 TABLET ORAL at 15:03

## 2024-08-08 RX ADMIN — AMLODIPINE BESYLATE 5 MG: 5 TABLET ORAL at 08:32

## 2024-08-08 RX ADMIN — HYDRALAZINE HYDROCHLORIDE 10 MG: 10 TABLET ORAL at 13:14

## 2024-08-08 RX ADMIN — DULOXETINE HYDROCHLORIDE 30 MG: 30 CAPSULE, DELAYED RELEASE ORAL at 08:31

## 2024-08-08 RX ADMIN — ACETAMINOPHEN 650 MG: 325 TABLET ORAL at 06:23

## 2024-08-08 RX ADMIN — PANTOPRAZOLE SODIUM 40 MG: 40 TABLET, DELAYED RELEASE ORAL at 08:32

## 2024-08-08 RX ADMIN — HYDRALAZINE HYDROCHLORIDE 10 MG: 10 TABLET ORAL at 08:32

## 2024-08-08 NOTE — PROGRESS NOTES
[facility]   Neurosurgery Progress Note    Patient Name: Warren Baker  : 1955  MRN: [id number]  Date of admission: 2024  Surgical Procedures Since Admission:  Procedure(s):  ANTERIOR CERVICAL DISCECTOMY AND FUSION USING ALLOGRAFT BONE AND INSTRUMENTATION, right approach, cervical 3-cervical 4  Surgeon:  Patrice Frazier MD  Status:  1 Day Post-Op  -------------------    Subjective   Subjective     Chief Complaint: Postop day 1 from ACDF.  Biggest issue is swallowing.    History of Present Illness   He reports some posterior neck pain.  His biggest issue has been swallowing.  He has been able to do some liquids this morning but had difficulty swallowing green beans last evening.  He has been out of bed to ambulate to the bathroom twice.  He reports decreased numbness in his hands.  He has not yet tried any food as of this morning.      Objective   Objective     Vitals:   Temp:  [96.8 °F (36 °C)-98.1 °F (36.7 °C)] 97.7 °F (36.5 °C)  Heart Rate:  [72-96] 76  Resp:  [13-22] 16  BP: ()/(36-84) 110/63  Flow (L/min):  [2-4] 2    No notable swelling.  Voice strong.  Arm movements equal and symmetric.     Assessment & Plan   Assessment / Plan     Brief Patient Summary:  Warren Baker is a 68 y.o. male who is postop day 1 from ACDF with dysphagia.    Active Hospital Problems:  Active Hospital Problems    Diagnosis     **Status post cervical spinal fusion     Myelomalacia of cervical cord      Plan:   Encouraged to advance diet as tolerated.  Encouraged to walk outside of the room.  If he is able to start to improve his p.o. intake and ambulate outside of the room, he could potentially be discharged later today.  If not we will try for discharge tomorrow.

## 2024-08-08 NOTE — PLAN OF CARE
Goal Outcome Evaluation:  Plan of Care Reviewed With: patient        Progress: no change  Outcome Evaluation: Scheduled Tylenol given, effective according to Pt. Pt continues to c/o swallowing discomfort, able to swallow meds and liquids.

## 2024-08-08 NOTE — PLAN OF CARE
Goal Outcome Evaluation:  Plan of Care Reviewed With: patient, spouse        Progress: no change  Tolerating full liquid diet, up to walk hallway x1, VSS, no c/o pain, discharge home self care.

## 2024-08-26 ENCOUNTER — TELEPHONE (OUTPATIENT)
Dept: NEUROSURGERY | Facility: CLINIC | Age: 69
End: 2024-08-26
Payer: MEDICARE

## 2024-08-26 NOTE — TELEPHONE ENCOUNTER
Early appointments times available if interested.       Ok. For HUB to schedule. Appointment type will need to be changed to Post-Op.

## 2024-08-27 ENCOUNTER — OFFICE VISIT (OUTPATIENT)
Dept: NEUROSURGERY | Facility: CLINIC | Age: 69
End: 2024-08-27
Payer: MEDICARE

## 2024-08-27 VITALS
SYSTOLIC BLOOD PRESSURE: 139 MMHG | WEIGHT: 175 LBS | HEIGHT: 72 IN | BODY MASS INDEX: 23.7 KG/M2 | DIASTOLIC BLOOD PRESSURE: 72 MMHG | HEART RATE: 91 BPM

## 2024-08-27 DIAGNOSIS — G95.89 MYELOMALACIA OF CERVICAL CORD: Primary | ICD-10-CM

## 2024-08-27 DIAGNOSIS — Z98.1 STATUS POST CERVICAL SPINAL FUSION: ICD-10-CM

## 2024-08-27 PROCEDURE — 99024 POSTOP FOLLOW-UP VISIT: CPT | Performed by: PHYSICIAN ASSISTANT

## 2024-08-27 NOTE — PROGRESS NOTES
Patient being seen for today for Post-op  .    Subjective    Warren Baker is a 68 y.o. male that presents with Post-op  .    HPI  Previously: He is status post ACDF using right approach at C3-C4 on 8/7/2024.  He previously complained of neck pain and bilateral arm numbness with spinal cord signal change likely representing myelomalacia around C3-C4.    Today:  - S/P ACDF C3-C4 (R approach) on 08/07/2024 (3 wks ago)  - Pre-op: neck pain, bilat arm numbness  - Current symptoms unchanged since surgery  - Pain score: 5-10 scale     reports that he has been smoking cigarettes. He has a 13.3 pack-year smoking history. He has never used smokeless tobacco.    Review of Systems   Musculoskeletal:  Positive for neck pain.       Objective   Vitals:    08/27/24 1542   BP: 139/72   Pulse: 91        Physical Exam  Constitutional:       Appearance: Normal appearance.   Neck:      Comments: Pain with ROM (hossein. To the left)  Pulmonary:      Effort: Pulmonary effort is normal.   Musculoskeletal:         General: Tenderness (cervical areas) present.      Comments: Alvarado's negative bilaterally   Skin:     Comments: Right cervical incision is well-healed without erythema or discharge   Neurological:      General: No focal deficit present.      Mental Status: He is alert and oriented to person, place, and time.      Sensory: No sensory deficit.      Motor: No weakness.      Deep Tendon Reflexes: Reflexes normal.   Psychiatric:         Mood and Affect: Mood normal.         Behavior: Behavior normal.          Result Review   None.     Assessment and Plan {CC Problem List  Visit Diagnosis  ROS  Review (Popup)  Health Maintenance  Quality  BestPractice  Medications  SmartSets  SnapShot Encounters  Media :23}   Diagnoses and all orders for this visit:    1. Myelomalacia of cervical cord-C3-4 (Primary)  -     XR Spine Cervical Complete 4 or 5 View; Future    2. Status post cervical spinal fusion  -     XR Spine Cervical Complete  4 or 5 View; Future    1. Post-operative ACDF C3-C4  - Early post-op period (3 wks)  - Spinal cord signal change (myelomalacia) at C3-C4 pre-op  - Symptoms may improve over time, but complete resolution uncertain due to cord changes  - Continue activity restrictions for 9 more weeks:    * No lifting >10 lbs    * Avoid bending/twisting    * Avoid strenuous/jarring activities  - F/U in 9 wks (12 wks post-op):    * X-ray 5 days prior at [facility] (external order placed)    * Assess hardware position and interbody spacer    * Likely lift restrictions at this visit  - Monitor symptoms, especially for worsening (may require MRI if deterioration)  - Long-term f/u:    * Q3 month X-rays for 1st year to monitor bone growth    * Full fusion expected in 6-12 months    Additional Notes:  - Pt educated on surgical outcomes, recovery expectations  - Discussed importance of adhering to restrictions  - Pt to obtain X-ray images on disc if possible  - F/U appointment scheduled    Follow Up {Instructions Charge Capture  Follow-up Communications :23}   Return in about 9 weeks (around 10/29/2024).

## 2024-10-23 ENCOUNTER — HOSPITAL ENCOUNTER (OUTPATIENT)
Dept: OTHER | Facility: HOSPITAL | Age: 69
Discharge: HOME | End: 2024-10-23

## 2024-10-24 DIAGNOSIS — G95.89 MYELOMALACIA OF CERVICAL CORD: ICD-10-CM

## 2024-10-24 DIAGNOSIS — Z98.1 STATUS POST CERVICAL SPINAL FUSION: ICD-10-CM

## 2024-10-29 ENCOUNTER — OFFICE VISIT (OUTPATIENT)
Dept: NEUROSURGERY | Facility: CLINIC | Age: 69
End: 2024-10-29
Payer: MEDICARE

## 2024-10-29 VITALS
WEIGHT: 176.4 LBS | HEART RATE: 74 BPM | DIASTOLIC BLOOD PRESSURE: 50 MMHG | OXYGEN SATURATION: 100 % | HEIGHT: 72 IN | SYSTOLIC BLOOD PRESSURE: 114 MMHG | BODY MASS INDEX: 23.89 KG/M2

## 2024-10-29 DIAGNOSIS — Z98.1 STATUS POST CERVICAL SPINAL FUSION: ICD-10-CM

## 2024-10-29 DIAGNOSIS — G95.89 MYELOMALACIA OF CERVICAL CORD: Primary | ICD-10-CM

## 2024-10-29 PROCEDURE — 99024 POSTOP FOLLOW-UP VISIT: CPT | Performed by: PHYSICIAN ASSISTANT

## 2024-10-29 PROCEDURE — 1159F MED LIST DOCD IN RCRD: CPT | Performed by: PHYSICIAN ASSISTANT

## 2024-10-29 PROCEDURE — 1160F RVW MEDS BY RX/DR IN RCRD: CPT | Performed by: PHYSICIAN ASSISTANT

## 2024-10-29 RX ORDER — OMEGA-3S/DHA/EPA/FISH OIL/D3 300MG-1000
400 CAPSULE ORAL DAILY
COMMUNITY

## 2024-10-29 NOTE — PROGRESS NOTES
Patient being seen for today for Follow-up (ACDF 8/7, doesn't feel like it's getting any better.)  .    Subjective    Warren Baker is a 68 y.o. male that presents with Follow-up (ACDF 8/7, doesn't feel like it's getting any better.)  .    HPI  Previously: Last seen on 8/27/2024 status post ACDF using right approach at C3-C4 on 8/7/2024.  He was reporting unchanged neck pain and bilateral arm numbness.  There was plan to continue physical restrictions for 9 weeks and follow-up with x-ray to monitor the ACDF at C3-C4.    Today: He reports continued neck pain and numbness and weakness in the arms.    Denies new or changed complaints.     reports that he has been smoking cigarettes. He has a 13.3 pack-year smoking history. He has never used smokeless tobacco.    Review of Systems   Musculoskeletal:  Positive for neck pain.   Neurological:  Positive for weakness and numbness.       Objective   Vitals:    10/29/24 0947   BP: 114/50   Pulse: 74   SpO2: 100%        Physical Exam  Constitutional:       Appearance: Normal appearance.   Pulmonary:      Effort: Pulmonary effort is normal.   Musculoskeletal:         General: No tenderness.      Comments: Alvarado's negative bilaterally   Neurological:      General: No focal deficit present.      Mental Status: He is alert and oriented to person, place, and time.      Sensory: No sensory deficit.      Motor: Weakness (left greater than right hand ) present.      Deep Tendon Reflexes: Reflexes normal.   Psychiatric:         Mood and Affect: Mood normal.         Behavior: Behavior normal.          Result Review   I have personally interpreted the x-ray of cervical spine from 10/23/2024 which shows stable ACDF at C3-C4.     Assessment and Plan {CC Problem List  Visit Diagnosis  ROS  Review (Popup)  [facility] Maintenance  Quality  BestPractice  Medications  SmartSets  SnapShot Encounters  Media :23}   Diagnoses and all orders for this visit:    1. Myelomalacia of  cervical cord-C3-4 (Primary)  -     Cancel: XR Spine Cervical Complete 4 or 5 View; Future  -     XR Spine Cervical Complete 4 or 5 View; Future    2. Status post cervical spinal fusion  -     Cancel: XR Spine Cervical Complete 4 or 5 View; Future  -     XR Spine Cervical Complete 4 or 5 View; Future    He has not seen any improvement in neck pain or arm complaints. It is not typical for surgery to help neck pain. We did, again, discuss that with spinal cord injury, if he sees improvement in the arm complaints it will be slow - but even across time he may not see significant improvement because of the myelomalacia.    It has been 12 weeks since his surgery and he may technically at this stage lift physical restrictions and return to normal activity as tolerated. He may consider PT.    He will monitor for new or worsening complaints and notify us of change.    He will follow-up in 3 months to reassess with x-ray to monitor the ACDF at C3-C4, sooner if needed.    Follow Up {Instructions Charge Capture  Follow-up Communications :23}   Return in about 3 months (around 1/29/2025).

## 2025-01-23 ENCOUNTER — HOSPITAL ENCOUNTER (OUTPATIENT)
Dept: OTHER | Facility: HOSPITAL | Age: 70
Discharge: HOME | End: 2025-01-23

## 2025-01-23 ENCOUNTER — TELEPHONE (OUTPATIENT)
Dept: NEUROSURGERY | Facility: CLINIC | Age: 70
End: 2025-01-23
Payer: MEDICARE

## 2025-01-23 DIAGNOSIS — Z98.1 STATUS POST CERVICAL SPINAL FUSION: ICD-10-CM

## 2025-01-23 DIAGNOSIS — G95.89 MYELOMALACIA OF CERVICAL CORD: ICD-10-CM

## 2025-01-23 NOTE — TELEPHONE ENCOUNTER
Warren has been notified of x-ray findings.     Warren was already aware of finding in the left lung and had a biopsy last week.

## 2025-01-29 ENCOUNTER — OFFICE VISIT (OUTPATIENT)
Dept: NEUROSURGERY | Facility: CLINIC | Age: 70
End: 2025-01-29
Payer: MEDICARE

## 2025-01-29 VITALS
SYSTOLIC BLOOD PRESSURE: 113 MMHG | HEART RATE: 68 BPM | DIASTOLIC BLOOD PRESSURE: 43 MMHG | BODY MASS INDEX: 23.16 KG/M2 | HEIGHT: 72 IN | WEIGHT: 171 LBS

## 2025-01-29 DIAGNOSIS — G95.89 MYELOMALACIA OF CERVICAL CORD: Primary | ICD-10-CM

## 2025-01-29 DIAGNOSIS — Z98.1 STATUS POST CERVICAL SPINAL FUSION: ICD-10-CM

## 2025-01-29 NOTE — PROGRESS NOTES
Patient being seen for today for Follow-up  .    Subjective    Warren Baker is a 69 y.o. male that presents with Follow-up  .    HPI  Previously: Last seen on 10/29/2024 status post ACDF using right approach at C3-C4 on 8/7/2024.  He did not see improvement in neck or arm complaints.  We discussed surgery typically would not help neck pain.  We again discussed the spinal cord injury and that if he saw improvement it would be arm complaints and likely would be slow to improve, although in some cases there may not be significant or full improvement with myelomalacia.  There was plan to lift physical restrictions and resume normal activity as tolerated.  There was plan to follow-up in 3 months to reassess with x-ray.    Today: He denies new or changed complaints. He maintains that surgery has not been helpful for his complaints so far.     reports that he has been smoking cigarettes. He has a 13.3 pack-year smoking history. He has never used smokeless tobacco.    Review of Systems   Musculoskeletal:  Positive for neck pain.       Objective   Vitals:    01/29/25 0947   BP: 113/43   Pulse: 68        Physical Exam  Constitutional:       Appearance: Normal appearance. He is normal weight.   Pulmonary:      Effort: Pulmonary effort is normal.   Musculoskeletal:         General: No tenderness.      Cervical back: Normal range of motion.      Comments: Alvarado's negative bilaterally   Neurological:      General: No focal deficit present.      Mental Status: He is alert and oriented to person, place, and time.      Sensory: No sensory deficit.      Motor: Weakness (left arm greater than right) present.      Deep Tendon Reflexes: Reflexes normal.   Psychiatric:         Mood and Affect: Mood normal.         Behavior: Behavior normal.          Result Review   I have personally interpreted the x-ray of cervical spine from 1/23/2025 which shows stable ACDF at C3-C4. There is a nodule in the left upper lung, possibly cancerous  according to the radiogist report, but patient advised that he was already aware of this.     Assessment and Plan {CC Problem List  Visit Diagnosis  ROS  Review (Popup)  [facility] Maintenance  Quality  BestPractice  Medications  SmartSets  SnapShot Encounters  Media :23}   Diagnoses and all orders for this visit:    1. Myelomalacia of cervical cord-C3-4 (Primary)  -     XR Spine Cervical Complete 4 or 5 View; Future    2. Status post cervical spinal fusion  -     XR Spine Cervical Complete 4 or 5 View; Future    He report no significant improvement after surgery so far.    We discussed that myelomalacia can be slow to improve, if it improves significantly at all.    The ACDF at C3-C4 appears stable on the x-ray.    We discussed the left upper lung nodule, this is something he is aware of already and has had previously biopsied without any concern for cancer.    He will monitor for new or worsening complaints and notify us of change.    He will follow-up in 3 months with x-ray 2-3 days in advance to monitor ACDF at C3-C4.  Follow Up {Instructions Charge Capture  Follow-up Communications :23}   Return in about 3 months (around 4/29/2025).

## 2025-04-26 ENCOUNTER — HOSPITAL ENCOUNTER (OUTPATIENT)
Dept: OTHER | Facility: HOSPITAL | Age: 70
Discharge: HOME | End: 2025-04-26
Payer: MEDICARE

## 2025-04-28 ENCOUNTER — TELEPHONE (OUTPATIENT)
Dept: NEUROSURGERY | Facility: CLINIC | Age: 70
End: 2025-04-28
Payer: MEDICARE

## 2025-04-28 DIAGNOSIS — Z98.1 STATUS POST CERVICAL SPINAL FUSION: ICD-10-CM

## 2025-04-28 DIAGNOSIS — G95.89 MYELOMALACIA OF CERVICAL CORD: ICD-10-CM

## 2025-04-28 NOTE — TELEPHONE ENCOUNTER
Reminder that at his appointment in January, Vickey has ordered an x-ray. This will need to be completed today for his appointment tomorrow.

## 2025-04-29 ENCOUNTER — OFFICE VISIT (OUTPATIENT)
Dept: NEUROSURGERY | Facility: CLINIC | Age: 70
End: 2025-04-29
Payer: MEDICARE

## 2025-04-29 VITALS
WEIGHT: 176 LBS | BODY MASS INDEX: 23.84 KG/M2 | DIASTOLIC BLOOD PRESSURE: 78 MMHG | SYSTOLIC BLOOD PRESSURE: 120 MMHG | OXYGEN SATURATION: 97 % | HEIGHT: 72 IN | HEART RATE: 67 BPM

## 2025-04-29 DIAGNOSIS — G95.89 MYELOMALACIA OF CERVICAL CORD: Primary | ICD-10-CM

## 2025-04-29 DIAGNOSIS — Z98.1 STATUS POST CERVICAL SPINAL FUSION: ICD-10-CM

## 2025-04-29 NOTE — PROGRESS NOTES
Patient being seen for today for Follow-up and Neck Pain  .    Subjective    Warren Baker is a 69 y.o. male that presents with Follow-up and Neck Pain  .    HPI  Previously: Last seen on 1/29/2025 status post ACDF on the right at C3-C4 on 8/7/2024.  At that time he was continuing to report no significant improvement after surgery.  We again discussed that myelomalacia can be slow to improve if improved significantly at all.  The ACDF at C3-C4 was stable on x-ray.  We discussed a left upper lung nodule visible on imaging which she reports he was aware of and has been previously biopsied without any concern.  There is plan to monitor for new or worsening complaints and notify us of change, follow-up in 3 months with x-ray to monitor the ACDF at C3-C4.    Today: He reports no new or changed complaints today.     reports that he has been smoking cigarettes. He has a 13.3 pack-year smoking history. He has never used smokeless tobacco.    Review of Systems    Objective   Vitals:    04/29/25 0927   BP: 120/78   Pulse: 67   SpO2: 97%        Physical Exam  Constitutional:       Appearance: Normal appearance. He is normal weight.   Neck:      Comments: Pain with ROM (looking down)  Pulmonary:      Effort: Pulmonary effort is normal.   Musculoskeletal:         General: No tenderness.      Comments: Alvarado's negative bilaterally   Neurological:      General: No focal deficit present.      Mental Status: He is alert and oriented to person, place, and time.      Sensory: No sensory deficit.      Motor: Weakness (left hand ) present.      Deep Tendon Reflexes: Reflexes normal.   Psychiatric:         Mood and Affect: Mood normal.         Behavior: Behavior normal.          Result Review   I have personally interpreted the x-ray of cervical spine from 4/26/2025 which shows stable ACDF at C3-C4.     Assessment and Plan {CC Problem List  Visit Diagnosis  ROS  Review (Popup)  Health Maintenance  Quality  BestPractice   Medications  SmartSets  SnapShot Encounters  Media :23}   Diagnoses and all orders for this visit:    1. Myelomalacia of cervical cord-C3-4 (Primary)  -     XR Spine Cervical Complete 4 or 5 View; Future    2. Status post cervical spinal fusion  -     XR Spine Cervical Complete 4 or 5 View; Future    The ACDF at C3-C4 is stable on x-ray.    He will monitor for new or worsening complaints and notify us of change.    He will follow-up in 3 months to reassess with x-ray 2-3 days in advance to monitor the ACDF at C3-C4.    Follow Up {Instructions Charge Capture  Follow-up Communications :23}   Return in about 3 months (around 7/29/2025).

## 2025-07-28 ENCOUNTER — HOSPITAL ENCOUNTER (OUTPATIENT)
Dept: OTHER | Facility: HOSPITAL | Age: 70
Discharge: HOME | End: 2025-07-28
Payer: MEDICARE

## 2025-07-29 ENCOUNTER — OFFICE VISIT (OUTPATIENT)
Dept: NEUROSURGERY | Facility: CLINIC | Age: 70
End: 2025-07-29
Payer: MEDICARE

## 2025-07-29 VITALS
DIASTOLIC BLOOD PRESSURE: 74 MMHG | SYSTOLIC BLOOD PRESSURE: 124 MMHG | BODY MASS INDEX: 23.7 KG/M2 | HEART RATE: 71 BPM | HEIGHT: 72 IN | WEIGHT: 175 LBS

## 2025-07-29 DIAGNOSIS — Z98.1 STATUS POST CERVICAL SPINAL FUSION: ICD-10-CM

## 2025-07-29 DIAGNOSIS — G95.89 MYELOMALACIA OF CERVICAL CORD: Primary | ICD-10-CM

## 2025-07-29 NOTE — PROGRESS NOTES
Patient being seen for today for Follow-up (XR Spine Cervical Complete 4 or 5 View completed on 7/28/2025)  .    Subjective    Warren Baker is a 69 y.o. male that presents with Follow-up (XR Spine Cervical Complete 4 or 5 View completed on 7/28/2025)  .    HPI  Previously: Last seen on 4/29/2025 status post ACDF at C3-C4 on 8/7/2024.  The ACDF was stable on x-ray.  He was going to monitor for new or worsening complaints and notify us of change.  There is plan to follow-up in 3 months to reassess with x-ray.    Today: He reports pain in the neck and radiating to the shoulders rated 6/10.    Denies new or changed complaints.     reports that he has been smoking cigarettes. He has a 13.3 pack-year smoking history. He has never used smokeless tobacco.    Review of Systems   Musculoskeletal:  Positive for neck pain.       Objective   Vitals:    07/29/25 0822   BP: 124/74   Pulse: 71        Physical Exam  Constitutional:       Appearance: Normal appearance. He is normal weight.   Pulmonary:      Effort: Pulmonary effort is normal.   Musculoskeletal:         General: No tenderness.      Cervical back: Normal range of motion.      Comments: Alvarado's negative   Neurological:      General: No focal deficit present.      Mental Status: He is alert and oriented to person, place, and time.      Sensory: No sensory deficit.      Motor: Weakness (left hand) present.      Deep Tendon Reflexes: Reflexes abnormal (brisk in right arm).   Psychiatric:         Mood and Affect: Mood normal.         Behavior: Behavior normal.          Result Review   I have independently interpreted the x-ray of cervical spine from 7/28/2025 which shows stable appearing ACDF at C3-C4.     Assessment and Plan {CC Problem List  Visit Diagnosis  ROS  Review (Popup)  [facility] Maintenance  Quality  BestPractice  Medications  SmartSets  SnapShot Encounters  Media :23}   Diagnoses and all orders for this visit:    1. Myelomalacia of cervical  cord-C3-4 (Primary)    2. Status post cervical spinal fusion    He continues to do well s/p ACDF at C3-C4.    The ACDF is stable on x-ray.    He is nearly 1 year out from surgery. At this point, I would have him monitor for new or worsening complaints and notify us of change.    He will follow-up PRN.    Follow Up {Instructions Charge Capture  Follow-up Communications :23}   Return if symptoms worsen or fail to improve.

## 2025-08-05 DIAGNOSIS — G95.89 MYELOMALACIA OF CERVICAL CORD: ICD-10-CM

## 2025-08-05 DIAGNOSIS — Z98.1 STATUS POST CERVICAL SPINAL FUSION: ICD-10-CM

## (undated) DEVICE — ELECTRD BLD EDGE/INSUL1P SFTY SLV 2.75IN

## (undated) DEVICE — LAMINECTOMY CERVICAL DISC-LF: Brand: MEDLINE INDUSTRIES, INC.

## (undated) DEVICE — RETRACTION ASPIRATOR: Brand: FLOWTRIEVER

## (undated) DEVICE — GLV SURG BIOGEL LTX PF 7 1/2

## (undated) DEVICE — DISTRACT SCRW 14MM STRL

## (undated) DEVICE — STERILE POLYISOPRENE POWDER-FREE SURGICAL GLOVES WITH EMOLLIENT COATING: Brand: PROTEXIS

## (undated) DEVICE — BANDAGE,GAUZE,BULKEE II,4.5"X4.1YD,STRL: Brand: MEDLINE

## (undated) DEVICE — DRP MICROSCP LECIA W/CLEARLENS 137X381CM

## (undated) DEVICE — SUT VIC 2/0 CT1 36IN

## (undated) DEVICE — SUT MNCRYL PLS ANTIB UD 4/0 PS2 18IN

## (undated) DEVICE — GAMMEX® NON-LATEX SIZE 7.5, STERILE NEOPRENE POWDER-FREE SURGICAL GLOVE: Brand: GAMMEX

## (undated) DEVICE — SLV SCD KN/LEN ADJ EXPRSS BLENDED MD 1P/U